# Patient Record
(demographics unavailable — no encounter records)

---

## 2024-11-07 NOTE — REASON FOR VISIT
[Consultation] : a consultation visit [FreeTextEntry1] : Kindly asked by Dr. Mayer to consult and evaluate patient for      colon screening               A copy of this note is being sent to physician requesting consultation.

## 2024-11-07 NOTE — HISTORY OF PRESENT ILLNESS
[FreeTextEntry1] : 61f no sigPMH, presenting for colon cancer screening. Pt denies abdominal pain, rectal bleeding, change in bowel habits, or unexplained weight loss.    + hx hemorrhoids - - fiber related - improves when she eats "clean"  Last colonoscopy:  '15 - inflammatory polyp   Soc:  no tobacco or significant EtOH FHx: F- Polyps  ROS: Constitutional:: no weight loss, fevers ENT: no deafness Eyes: not blind Neck: no LN Chest: no dyspnea/cough Cardiac: no chest pain Vascular: no leg swelling GI: no abdominal pain, nausea, vomiting, diarrhea, constipation, rectal bleeding, dysphagia, melena unless otherwise noted in HPI : no dysuria, dark urine Skin: no rashes, jaundice Heme: no bleeding Endocrine: no DM unless otherwise stated in HPI  Px: (VS noted below) General: NAD Eyes: anicteric Oropharynx:  clear Neck: no LN Chest: normal respiratory effort CVS: regular Abd: soft, NT, ND, +BS, no HSM Ext: no atrophy Neuro: grossly nonfocal  Labs/imaging/prior endoscopic results reviewed to the extent available and noted in HPI

## 2024-11-07 NOTE — CONSULT LETTER
[FreeTextEntry1] : Dear Dr. Mayer,  I had the pleasure of evaluating your patient,  CARLOS THOMAS.  Please refer to my note below.  Thank you very much for allowing me to participate in the care of this patient.  If you have any questions, please do not hesitate to contact me.  Sincerely,   Armen Zuluaga MD

## 2024-11-07 NOTE — ASSESSMENT
[FreeTextEntry1] : - Colon cancer screening - colonoscopy due - Colonoscopy scheduled - Risks, benefits, alternatives were discussed, including but not limited to bleeding, infection, perforation and sedation risks. Additionally, the possibility of missed lesions was conveyed.  - hx hemorrhoids - reviewed dietary and lifestyle modifications, including increased fluid, fiber intake, as well as habituation / following urge to defecate, cutting back on bread/pasta, and to consider starting fiber supplement (eg.,  psyllium)  PMD/consultation/hospital notes and Labs/imaging/prior endoscopic results reviewed to extent noted in HPI; and, if procedure code billed on this visit for lab draw, this serves to signify that labs were drawn here in this office. Pt also advised that any studies ordered, if prior authorization required it may take up to a week to confirm - and if pt has not heard RE: scheduling by a week, they should call this office.

## 2025-04-10 NOTE — HISTORY OF PRESENT ILLNESS
[FreeTextEntry1] : 61  p2 postmenopausal  (LMP 51)  here for well women exam. Denies gyn complaints. Not sexually active regularly with . HAs some vaginal dryness. Exercises walking daily, weightlifting.   Pap 4/3/2024 NILM/ HPV neg  GyNHx: denies fibroids, or cyst. Hx of abnormal pap over 10 years ago with negative colposcopy  Mammogram: 2/22/2025 BR II  Colonoscopy 12/20/2024 recall 7-10years, polyps  BD 10/29/2019 Normal